# Patient Record
Sex: MALE | Race: OTHER | URBAN - METROPOLITAN AREA
[De-identification: names, ages, dates, MRNs, and addresses within clinical notes are randomized per-mention and may not be internally consistent; named-entity substitution may affect disease eponyms.]

---

## 2017-04-26 ENCOUNTER — APPOINTMENT (RX ONLY)
Dept: URBAN - METROPOLITAN AREA CLINIC 39 | Facility: CLINIC | Age: 82
Setting detail: DERMATOLOGY
End: 2017-04-26

## 2017-04-26 DIAGNOSIS — L82.1 OTHER SEBORRHEIC KERATOSIS: ICD-10-CM

## 2017-04-26 DIAGNOSIS — L82.0 INFLAMED SEBORRHEIC KERATOSIS: ICD-10-CM

## 2017-04-26 PROBLEM — D48.5 NEOPLASM OF UNCERTAIN BEHAVIOR OF SKIN: Status: ACTIVE | Noted: 2017-04-26

## 2017-04-26 PROCEDURE — ? BIOPSY BY SHAVE METHOD

## 2017-04-26 PROCEDURE — 11101: CPT

## 2017-04-26 PROCEDURE — ? COUNSELING

## 2017-04-26 PROCEDURE — ? OTHER

## 2017-04-26 PROCEDURE — 99202 OFFICE O/P NEW SF 15 MIN: CPT | Mod: 25

## 2017-04-26 PROCEDURE — 11100: CPT

## 2017-04-26 ASSESSMENT — LOCATION ZONE DERM: LOCATION ZONE: FACE

## 2017-04-26 ASSESSMENT — LOCATION DETAILED DESCRIPTION DERM
LOCATION DETAILED: RIGHT INFERIOR CENTRAL MALAR CHEEK
LOCATION DETAILED: LEFT CENTRAL MALAR CHEEK
LOCATION DETAILED: RIGHT LATERAL FOREHEAD
LOCATION DETAILED: LEFT INFERIOR CENTRAL MALAR CHEEK

## 2017-04-26 ASSESSMENT — LOCATION SIMPLE DESCRIPTION DERM
LOCATION SIMPLE: RIGHT FOREHEAD
LOCATION SIMPLE: LEFT CHEEK
LOCATION SIMPLE: RIGHT CHEEK

## 2017-04-26 NOTE — PROCEDURE: OTHER
Detail Level: Simple
Note Text (......Xxx Chief Complaint.): This diagnosis correlates with the
Other (Free Text): The E/M service provided during this visit was medically necessary, significant, and independent from the procedure(s) provided on the same date of service and included documented elements above and beyond the usual pre-, intra-, and post-operative work associated with the procedure(s).

## 2017-04-26 NOTE — HPI: SKIN LESION
How Severe Is Your Skin Lesion?: mild
Has Your Skin Lesion Been Treated?: been treated
Is This A New Presentation, Or A Follow-Up?: Skin Lesion
When Was It Treated?: 03/26/17

## 2017-04-26 NOTE — PROCEDURE: BIOPSY BY SHAVE METHOD
Post-Care Instructions: In detail post-care instructions were reviewed with the patient. Patient is to keep the biopsy site dry overnight, and then wash with warm soapy water and apply bacitracin or Aquaphor twice daily until healed.

## 2018-06-08 ENCOUNTER — APPOINTMENT (RX ONLY)
Dept: URBAN - METROPOLITAN AREA CLINIC 39 | Facility: CLINIC | Age: 83
Setting detail: DERMATOLOGY
End: 2018-06-08

## 2018-06-08 DIAGNOSIS — D22 MELANOCYTIC NEVI: ICD-10-CM

## 2018-06-08 DIAGNOSIS — L82.1 OTHER SEBORRHEIC KERATOSIS: ICD-10-CM

## 2018-06-08 DIAGNOSIS — D485 NEOPLASM OF UNCERTAIN BEHAVIOR OF SKIN: ICD-10-CM

## 2018-06-08 DIAGNOSIS — Z85.828 PERSONAL HISTORY OF OTHER MALIGNANT NEOPLASM OF SKIN: ICD-10-CM

## 2018-06-08 DIAGNOSIS — L57.0 ACTINIC KERATOSIS: ICD-10-CM

## 2018-06-08 PROBLEM — D22.9 MELANOCYTIC NEVI, UNSPECIFIED: Status: ACTIVE | Noted: 2018-06-08

## 2018-06-08 PROBLEM — D48.5 NEOPLASM OF UNCERTAIN BEHAVIOR OF SKIN: Status: ACTIVE | Noted: 2018-06-08

## 2018-06-08 PROBLEM — E13.9 OTHER SPECIFIED DIABETES MELLITUS WITHOUT COMPLICATIONS: Status: ACTIVE | Noted: 2018-06-08

## 2018-06-08 PROCEDURE — 17000 DESTRUCT PREMALG LESION: CPT

## 2018-06-08 PROCEDURE — 99213 OFFICE O/P EST LOW 20 MIN: CPT | Mod: 25

## 2018-06-08 PROCEDURE — 11101: CPT

## 2018-06-08 PROCEDURE — ? LIQUID NITROGEN

## 2018-06-08 PROCEDURE — ? BIOPSY BY SHAVE METHOD

## 2018-06-08 PROCEDURE — ? COUNSELING

## 2018-06-08 PROCEDURE — 17003 DESTRUCT PREMALG LES 2-14: CPT

## 2018-06-08 PROCEDURE — 11100: CPT | Mod: 59

## 2018-06-08 ASSESSMENT — LOCATION DETAILED DESCRIPTION DERM
LOCATION DETAILED: RIGHT CENTRAL MALAR CHEEK
LOCATION DETAILED: RIGHT SUPERIOR CENTRAL MALAR CHEEK
LOCATION DETAILED: LEFT CENTRAL ZYGOMA
LOCATION DETAILED: LEFT SUPERIOR CENTRAL BUCCAL CHEEK
LOCATION DETAILED: RIGHT CENTRAL ZYGOMA

## 2018-06-08 ASSESSMENT — LOCATION ZONE DERM: LOCATION ZONE: FACE

## 2018-06-08 ASSESSMENT — LOCATION SIMPLE DESCRIPTION DERM
LOCATION SIMPLE: LEFT ZYGOMA
LOCATION SIMPLE: LEFT CHEEK
LOCATION SIMPLE: RIGHT ZYGOMA
LOCATION SIMPLE: RIGHT CHEEK

## 2018-06-08 NOTE — PROCEDURE: LIQUID NITROGEN

## 2018-06-08 NOTE — PROCEDURE: BIOPSY BY SHAVE METHOD
Lab: [id number]
Billing Type: Third-Party Bill
Electrodesiccation Text: The wound bed was treated with electrodesiccation after the biopsy was performed.
Anesthesia Type: 1% lidocaine without epinephrine
Hemostasis: Aluminum Chloride
Destruction After The Procedure: No
Wound Care: Petrolatum
Was A Bandage Applied: Yes
Type Of Destruction Used: Curettage
X Size Of Lesion In Cm: 0
Biopsy Type: H and E
Biopsy Method: Dermablade
Cryotherapy Text: The wound bed was treated with cryotherapy after the biopsy was performed.
Post-Care Instructions: I reviewed with the patient in detail post-care instructions. Patient is to keep the biopsy site dry overnight, and then apply bacitracin twice daily until healed. Patient may apply hydrogen peroxide soaks to remove any crusting.
Electrodesiccation And Curettage Text: The wound bed was treated with electrodesiccation and curettage after the biopsy was performed.
Detail Level: Detailed
Curettage Text: The wound bed was treated with curettage after the biopsy was performed.
Dressing: bandage
Silver Nitrate Text: The wound bed was treated with silver nitrate after the biopsy was performed.
Consent: Written consent was obtained and risks were reviewed including but not limited to scarring, infection, bleeding, scabbing, incomplete removal, nerve damage and allergy to anesthesia.
Notification Instructions: Patient will be notified of biopsy results. However, patient instructed to call the office if not contacted within 2 weeks.
Anesthesia Volume In Cc (Will Not Render If 0): 0.5

## 2018-06-08 NOTE — PROCEDURE: MIPS QUALITY
Quality 110: Preventive Care And Screening: Influenza Immunization: Influenza Immunization Administered during Influenza season
Quality 431: Preventive Care And Screening: Unhealthy Alcohol Use - Screening: Patient screened for unhealthy alcohol use using a single question and scores less than 2 times per year
Quality 111:Pneumonia Vaccination Status For Older Adults: Pneumococcal Vaccination Previously Received
Quality 226: Preventive Care And Screening: Tobacco Use: Screening And Cessation Intervention: Patient screened for tobacco and never smoked
Detail Level: Detailed
Quality 47: Advance Care Plan: Advance Care Planning discussed and documented; advance care plan or surrogate decision maker documented in the medical record.

## 2018-06-22 ENCOUNTER — APPOINTMENT (RX ONLY)
Dept: URBAN - METROPOLITAN AREA CLINIC 39 | Facility: CLINIC | Age: 83
Setting detail: DERMATOLOGY
End: 2018-06-22

## 2018-06-22 DIAGNOSIS — B07.8 OTHER VIRAL WARTS: ICD-10-CM

## 2018-06-22 DIAGNOSIS — L57.0 ACTINIC KERATOSIS: ICD-10-CM

## 2018-06-22 PROBLEM — I10 ESSENTIAL (PRIMARY) HYPERTENSION: Status: ACTIVE | Noted: 2018-06-22

## 2018-06-22 PROCEDURE — 17003 DESTRUCT PREMALG LES 2-14: CPT

## 2018-06-22 PROCEDURE — 17000 DESTRUCT PREMALG LESION: CPT | Mod: 59

## 2018-06-22 PROCEDURE — ? LIQUID NITROGEN

## 2018-06-22 PROCEDURE — ? COUNSELING

## 2018-06-22 PROCEDURE — 17110 DESTRUCTION B9 LES UP TO 14: CPT

## 2018-06-22 ASSESSMENT — LOCATION DETAILED DESCRIPTION DERM
LOCATION DETAILED: LEFT SUPERIOR MEDIAL MALAR CHEEK
LOCATION DETAILED: RIGHT CENTRAL MALAR CHEEK
LOCATION DETAILED: RIGHT SUPERIOR CENTRAL MALAR CHEEK
LOCATION DETAILED: LEFT SUPERIOR CENTRAL BUCCAL CHEEK

## 2018-06-22 ASSESSMENT — LOCATION SIMPLE DESCRIPTION DERM
LOCATION SIMPLE: LEFT CHEEK
LOCATION SIMPLE: RIGHT CHEEK

## 2018-06-22 ASSESSMENT — LOCATION ZONE DERM: LOCATION ZONE: FACE

## 2018-06-22 NOTE — PROCEDURE: MIPS QUALITY
Quality 111:Pneumonia Vaccination Status For Older Adults: Pneumococcal Vaccination Previously Received
Quality 110: Preventive Care And Screening: Influenza Immunization: Influenza Immunization Administered during Influenza season
Quality 47: Advance Care Plan: Advance Care Planning discussed and documented; advance care plan or surrogate decision maker documented in the medical record.
Quality 226: Preventive Care And Screening: Tobacco Use: Screening And Cessation Intervention: Patient screened for tobacco and never smoked
Detail Level: Detailed
Quality 431: Preventive Care And Screening: Unhealthy Alcohol Use - Screening: Patient screened for unhealthy alcohol use using a single question and scores less than 2 times per year

## 2018-06-22 NOTE — PROCEDURE: LIQUID NITROGEN
Add 52 Modifier (Optional): no
Medical Necessity Clause: This procedure was medically necessary because the lesions that were treated were:
Consent: The patient's consent was obtained including but not limited to risks of crusting, scabbing, blistering, scarring, darker or lighter pigmentary change, recurrence, incomplete removal and infection.
Detail Level: Detailed
Medical Necessity Information: It is in your best interest to select a reason for this procedure from the list below. All of these items fulfill various CMS LCD requirements except the new and changing color options.
Post-Care Instructions: I reviewed with the patient in detail post-care instructions. Patient is to wear sunprotection, and avoid picking at any of the treated lesions. Pt may apply Vaseline to crusted or scabbing areas.
Duration Of Freeze Thaw-Cycle (Seconds): 10

## 2020-02-11 ENCOUNTER — APPOINTMENT (RX ONLY)
Dept: URBAN - METROPOLITAN AREA CLINIC 39 | Facility: CLINIC | Age: 85
Setting detail: DERMATOLOGY
End: 2020-02-11

## 2020-02-11 DIAGNOSIS — L57.0 ACTINIC KERATOSIS: ICD-10-CM

## 2020-02-11 PROCEDURE — 17003 DESTRUCT PREMALG LES 2-14: CPT

## 2020-02-11 PROCEDURE — 17000 DESTRUCT PREMALG LESION: CPT

## 2020-02-11 PROCEDURE — ? COUNSELING

## 2020-02-11 PROCEDURE — ? LIQUID NITROGEN

## 2020-02-11 ASSESSMENT — LOCATION DETAILED DESCRIPTION DERM
LOCATION DETAILED: RIGHT SUPERIOR LATERAL MALAR CHEEK
LOCATION DETAILED: RIGHT CENTRAL MALAR CHEEK

## 2020-02-11 ASSESSMENT — LOCATION ZONE DERM: LOCATION ZONE: FACE

## 2020-02-11 ASSESSMENT — LOCATION SIMPLE DESCRIPTION DERM: LOCATION SIMPLE: RIGHT CHEEK

## 2020-02-11 NOTE — PROCEDURE: LIQUID NITROGEN
Detail Level: Detailed
Duration Of Freeze Thaw-Cycle (Seconds): 3
Render Post-Care Instructions In Note?: no
Consent: The patient's consent was obtained.  Risks associated with liquid nitrogen including but not limited to crusting, scabbing, blistering, scarring, darker or lighter pigmentary change, recurrence, incomplete removal and infection.
Post-Care Instructions: I reviewed with the patient in detail post-care instructions. Patient is to wear sunprotection, and avoid picking at any of the treated lesions. Pt may apply Vaseline to crusted or scabbing areas.

## 2022-03-14 ENCOUNTER — PREPPED CHART (OUTPATIENT)
Dept: URBAN - METROPOLITAN AREA CLINIC 50 | Facility: CLINIC | Age: 87
End: 2022-03-14

## 2022-03-14 PROBLEM — H35.3221 NEOVASCULAR AMD WITH ACTIVE CNV: Noted: 2022-03-14

## 2022-03-14 PROBLEM — E11.3293 MILD NONPROLIFERATIVE DIABETIC RETINOPATHY: Noted: 2022-03-14

## 2022-03-14 PROBLEM — Z79.4 MILD NONPROLIFERATIVE DIABETIC RETINOPATHY: Noted: 2022-03-14

## 2022-03-14 PROBLEM — H43.813 POSTERIOR VITREOUS DETACHMENT: Noted: 2022-03-14

## 2022-03-14 PROBLEM — H35.3112 DRY AMD, INTERMEDIATE DRY STAGE: Noted: 2022-03-14

## 2022-03-14 PROBLEM — E11.3293 DIABETES, TYPE II WITH OCULAR COMPLICATIONS: Noted: 2022-03-14

## 2022-03-14 PROBLEM — Z79.4 DIABETES, TYPE II WITH OCULAR COMPLICATIONS: Noted: 2022-03-14

## 2022-03-14 PROBLEM — H35.3221 NEOVASCULAR AMD WITH ACTIVE CNV: Status: DETERIORATING | Noted: 2022-03-14

## 2022-04-06 ASSESSMENT — VISUAL ACUITY
OD_PH: 20/20
OS_PH: 20/40
OS_SC: 20/80-2
OD_SC: 20/25

## 2022-04-06 ASSESSMENT — TONOMETRY
OS_IOP_MMHG: 18
OD_IOP_MMHG: 17

## 2022-04-11 ENCOUNTER — FOLLOW UP (OUTPATIENT)
Dept: URBAN - METROPOLITAN AREA CLINIC 50 | Facility: CLINIC | Age: 87
End: 2022-04-11

## 2022-04-11 DIAGNOSIS — E11.3293: ICD-10-CM

## 2022-04-11 DIAGNOSIS — H43.813: ICD-10-CM

## 2022-04-11 DIAGNOSIS — H35.3112: ICD-10-CM

## 2022-04-11 DIAGNOSIS — H35.3221: ICD-10-CM

## 2022-04-11 PROCEDURE — 92014 COMPRE OPH EXAM EST PT 1/>: CPT

## 2022-04-11 PROCEDURE — PF5 LUCENTIS PREFILLED SYRINGE: Mod: 59,LT

## 2022-04-11 PROCEDURE — 67028 INJECTION EYE DRUG: CPT | Mod: 25,LT

## 2022-04-11 ASSESSMENT — TONOMETRY
OS_IOP_MMHG: 18
OD_IOP_MMHG: 20

## 2022-04-11 ASSESSMENT — VISUAL ACUITY
OS_PH: 20/30
OD_PH: 20/20
OS_SC: 20/100
OD_SC: 20/25

## 2022-05-09 ENCOUNTER — FOLLOW UP (OUTPATIENT)
Dept: URBAN - METROPOLITAN AREA CLINIC 50 | Facility: CLINIC | Age: 87
End: 2022-05-09

## 2022-05-09 DIAGNOSIS — H35.3112: ICD-10-CM

## 2022-05-09 DIAGNOSIS — E11.3293: ICD-10-CM

## 2022-05-09 DIAGNOSIS — H35.3221: ICD-10-CM

## 2022-05-09 DIAGNOSIS — H43.813: ICD-10-CM

## 2022-05-09 PROCEDURE — 92014 COMPRE OPH EXAM EST PT 1/>: CPT | Mod: 25

## 2022-05-09 PROCEDURE — 92134 CPTRZ OPH DX IMG PST SGM RTA: CPT

## 2022-05-09 PROCEDURE — 67028 INJECTION EYE DRUG: CPT

## 2022-05-09 PROCEDURE — PF5 LUCENTIS PREFILLED SYRINGE

## 2022-05-09 ASSESSMENT — VISUAL ACUITY
OS_PH: 20/40
OD_SC: 20/20
OS_SC: 20/100

## 2022-05-09 ASSESSMENT — TONOMETRY
OD_IOP_MMHG: 15
OS_IOP_MMHG: 15

## 2022-06-06 ENCOUNTER — FOLLOW UP (OUTPATIENT)
Dept: URBAN - METROPOLITAN AREA CLINIC 50 | Facility: CLINIC | Age: 87
End: 2022-06-06

## 2022-06-06 DIAGNOSIS — H43.813: ICD-10-CM

## 2022-06-06 DIAGNOSIS — E11.3293: ICD-10-CM

## 2022-06-06 DIAGNOSIS — H35.3221: ICD-10-CM

## 2022-06-06 DIAGNOSIS — H35.3112: ICD-10-CM

## 2022-06-06 PROCEDURE — 67028 INJECTION EYE DRUG: CPT

## 2022-06-06 PROCEDURE — PF5 LUCENTIS PREFILLED SYRINGE

## 2022-06-06 PROCEDURE — 92014 COMPRE OPH EXAM EST PT 1/>: CPT | Mod: 25

## 2022-06-06 PROCEDURE — 92134 CPTRZ OPH DX IMG PST SGM RTA: CPT

## 2022-06-06 ASSESSMENT — VISUAL ACUITY
OD_SC: 20/20
OS_SC: 20/40-2

## 2022-06-06 ASSESSMENT — TONOMETRY
OS_IOP_MMHG: 16
OD_IOP_MMHG: 18

## 2022-07-11 ENCOUNTER — FOLLOW UP (OUTPATIENT)
Dept: URBAN - METROPOLITAN AREA CLINIC 50 | Facility: CLINIC | Age: 87
End: 2022-07-11

## 2022-07-11 DIAGNOSIS — H43.813: ICD-10-CM

## 2022-07-11 DIAGNOSIS — E11.3293: ICD-10-CM

## 2022-07-11 DIAGNOSIS — H35.3112: ICD-10-CM

## 2022-07-11 DIAGNOSIS — H35.3221: ICD-10-CM

## 2022-07-11 PROCEDURE — 92014 COMPRE OPH EXAM EST PT 1/>: CPT | Mod: 25

## 2022-07-11 PROCEDURE — PF5 LUCENTIS PREFILLED SYRINGE

## 2022-07-11 PROCEDURE — 67028 INJECTION EYE DRUG: CPT

## 2022-07-11 PROCEDURE — 92134 CPTRZ OPH DX IMG PST SGM RTA: CPT

## 2022-07-11 ASSESSMENT — TONOMETRY
OS_IOP_MMHG: 13
OD_IOP_MMHG: 14

## 2022-07-11 ASSESSMENT — VISUAL ACUITY
OD_SC: 20/20
OS_SC: 20/50
OS_PH: 20/40-

## 2022-09-26 ENCOUNTER — FOLLOW UP (OUTPATIENT)
Dept: URBAN - METROPOLITAN AREA CLINIC 50 | Facility: CLINIC | Age: 87
End: 2022-09-26

## 2022-09-26 DIAGNOSIS — H35.3112: ICD-10-CM

## 2022-09-26 DIAGNOSIS — H35.3221: ICD-10-CM

## 2022-09-26 DIAGNOSIS — Z79.4: ICD-10-CM

## 2022-09-26 DIAGNOSIS — E11.3293: ICD-10-CM

## 2022-09-26 DIAGNOSIS — H43.813: ICD-10-CM

## 2022-09-26 PROCEDURE — 92134 CPTRZ OPH DX IMG PST SGM RTA: CPT

## 2022-09-26 PROCEDURE — PF5 LUCENTIS PREFILLED SYRINGE

## 2022-09-26 PROCEDURE — 67028 INJECTION EYE DRUG: CPT

## 2022-09-26 PROCEDURE — 92202 OPSCPY EXTND ON/MAC DRAW: CPT

## 2022-09-26 PROCEDURE — 99214 OFFICE O/P EST MOD 30 MIN: CPT | Mod: 25

## 2022-09-26 ASSESSMENT — VISUAL ACUITY
OS_PH: 20/40
OS_SC: 20/80+1
OD_SC: 20/25+2

## 2022-09-26 ASSESSMENT — TONOMETRY
OS_IOP_MMHG: 16
OD_IOP_MMHG: 16

## 2022-10-31 ENCOUNTER — ESTABLISHED COMPREHENSIVE EXAM (OUTPATIENT)
Dept: URBAN - METROPOLITAN AREA CLINIC 50 | Facility: CLINIC | Age: 87
End: 2022-10-31

## 2022-10-31 DIAGNOSIS — H35.3112: ICD-10-CM

## 2022-10-31 DIAGNOSIS — Z79.4: ICD-10-CM

## 2022-10-31 DIAGNOSIS — H35.3221: ICD-10-CM

## 2022-10-31 DIAGNOSIS — H43.813: ICD-10-CM

## 2022-10-31 DIAGNOSIS — E11.3293: ICD-10-CM

## 2022-10-31 PROCEDURE — 92134 CPTRZ OPH DX IMG PST SGM RTA: CPT

## 2022-10-31 PROCEDURE — PF5 LUCENTIS PREFILLED SYRINGE

## 2022-10-31 PROCEDURE — 92014 COMPRE OPH EXAM EST PT 1/>: CPT | Mod: 25

## 2022-10-31 PROCEDURE — 67028 INJECTION EYE DRUG: CPT

## 2022-10-31 ASSESSMENT — TONOMETRY
OD_IOP_MMHG: 18
OS_IOP_MMHG: 19

## 2022-10-31 ASSESSMENT — VISUAL ACUITY
OS_SC: 20/150
OS_PH: 20/60
OD_SC: 20/20

## 2022-12-05 ENCOUNTER — ESTABLISHED COMPREHENSIVE EXAM (OUTPATIENT)
Dept: URBAN - METROPOLITAN AREA CLINIC 50 | Facility: CLINIC | Age: 87
End: 2022-12-05

## 2022-12-05 DIAGNOSIS — H35.3112: ICD-10-CM

## 2022-12-05 DIAGNOSIS — H43.813: ICD-10-CM

## 2022-12-05 DIAGNOSIS — E11.3293: ICD-10-CM

## 2022-12-05 DIAGNOSIS — H35.3221: ICD-10-CM

## 2022-12-05 PROCEDURE — 67028 INJECTION EYE DRUG: CPT

## 2022-12-05 PROCEDURE — 92134 CPTRZ OPH DX IMG PST SGM RTA: CPT

## 2022-12-05 PROCEDURE — 92014 COMPRE OPH EXAM EST PT 1/>: CPT | Mod: 25

## 2022-12-05 PROCEDURE — PF5 LUCENTIS PREFILLED SYRINGE

## 2022-12-05 ASSESSMENT — TONOMETRY
OD_IOP_MMHG: 21
OS_IOP_MMHG: 12

## 2022-12-05 ASSESSMENT — VISUAL ACUITY
OS_PH: 20/60
OS_SC: 20/100-2
OD_SC: 20/20

## 2023-01-09 ENCOUNTER — ESTABLISHED COMPREHENSIVE EXAM (OUTPATIENT)
Dept: URBAN - METROPOLITAN AREA CLINIC 50 | Facility: CLINIC | Age: 88
End: 2023-01-09

## 2023-01-09 DIAGNOSIS — E11.3293: ICD-10-CM

## 2023-01-09 DIAGNOSIS — Z79.4: ICD-10-CM

## 2023-01-09 DIAGNOSIS — H35.3112: ICD-10-CM

## 2023-01-09 DIAGNOSIS — H35.3221: ICD-10-CM

## 2023-01-09 DIAGNOSIS — H43.813: ICD-10-CM

## 2023-01-09 PROCEDURE — PF5 LUCENTIS PREFILLED SYRINGE

## 2023-01-09 PROCEDURE — 92134 CPTRZ OPH DX IMG PST SGM RTA: CPT

## 2023-01-09 PROCEDURE — 92014 COMPRE OPH EXAM EST PT 1/>: CPT | Mod: 25

## 2023-01-09 PROCEDURE — 92202 OPSCPY EXTND ON/MAC DRAW: CPT | Mod: 59

## 2023-01-09 PROCEDURE — 67028 INJECTION EYE DRUG: CPT

## 2023-01-09 ASSESSMENT — TONOMETRY
OD_IOP_MMHG: 20
OS_IOP_MMHG: 18

## 2023-01-09 ASSESSMENT — VISUAL ACUITY
OS_SC: 20/100-1
OS_PH: 20/70-2
OD_SC: 20/20-1

## 2023-02-06 ENCOUNTER — FOLLOW UP (OUTPATIENT)
Dept: URBAN - METROPOLITAN AREA CLINIC 50 | Facility: CLINIC | Age: 88
End: 2023-02-06

## 2023-02-06 DIAGNOSIS — H35.3221: ICD-10-CM

## 2023-02-06 DIAGNOSIS — H35.3112: ICD-10-CM

## 2023-02-06 DIAGNOSIS — Z79.4: ICD-10-CM

## 2023-02-06 DIAGNOSIS — H43.813: ICD-10-CM

## 2023-02-06 DIAGNOSIS — E11.3293: ICD-10-CM

## 2023-02-06 PROCEDURE — 92014 COMPRE OPH EXAM EST PT 1/>: CPT | Mod: 25

## 2023-02-06 PROCEDURE — 92134 CPTRZ OPH DX IMG PST SGM RTA: CPT

## 2023-02-06 PROCEDURE — 67028 INJECTION EYE DRUG: CPT

## 2023-02-06 PROCEDURE — 92202 OPSCPY EXTND ON/MAC DRAW: CPT | Mod: 59

## 2023-02-06 PROCEDURE — PF5 LUCENTIS PREFILLED SYRINGE

## 2023-02-06 ASSESSMENT — VISUAL ACUITY
OD_SC: 20/20
OS_SC: 20/70
OS_PH: 20/50

## 2023-02-06 ASSESSMENT — TONOMETRY
OS_IOP_MMHG: 14
OD_IOP_MMHG: 14

## 2023-03-06 ENCOUNTER — FOLLOW UP (OUTPATIENT)
Dept: URBAN - METROPOLITAN AREA CLINIC 50 | Facility: CLINIC | Age: 88
End: 2023-03-06

## 2023-03-06 DIAGNOSIS — H35.3221: ICD-10-CM

## 2023-03-06 DIAGNOSIS — H43.813: ICD-10-CM

## 2023-03-06 DIAGNOSIS — E11.3293: ICD-10-CM

## 2023-03-06 DIAGNOSIS — H35.3112: ICD-10-CM

## 2023-03-06 PROCEDURE — 92014 COMPRE OPH EXAM EST PT 1/>: CPT | Mod: 25

## 2023-03-06 PROCEDURE — 92134 CPTRZ OPH DX IMG PST SGM RTA: CPT

## 2023-03-06 PROCEDURE — 92202 OPSCPY EXTND ON/MAC DRAW: CPT | Mod: NC

## 2023-03-06 PROCEDURE — 67028 INJECTION EYE DRUG: CPT

## 2023-03-06 PROCEDURE — PF5 LUCENTIS PREFILLED SYRINGE

## 2023-03-06 ASSESSMENT — VISUAL ACUITY
OD_SC: 20/20
OS_SC: 20/100-2
OS_PH: 20/50-2

## 2023-03-06 ASSESSMENT — TONOMETRY
OS_IOP_MMHG: 17
OD_IOP_MMHG: 14

## 2023-04-10 ENCOUNTER — FOLLOW UP (OUTPATIENT)
Dept: URBAN - METROPOLITAN AREA CLINIC 50 | Facility: CLINIC | Age: 88
End: 2023-04-10

## 2023-04-10 DIAGNOSIS — H35.3112: ICD-10-CM

## 2023-04-10 DIAGNOSIS — E11.3293: ICD-10-CM

## 2023-04-10 DIAGNOSIS — H35.3221: ICD-10-CM

## 2023-04-10 PROCEDURE — 67028 INJECTION EYE DRUG: CPT

## 2023-04-10 PROCEDURE — 92014 COMPRE OPH EXAM EST PT 1/>: CPT | Mod: 25

## 2023-04-10 PROCEDURE — PF5 LUCENTIS PREFILLED SYRINGE

## 2023-04-10 ASSESSMENT — VISUAL ACUITY
OD_SC: 20/20-2
OS_PH: 20/80-2
OS_SC: 20/100-1

## 2023-04-10 ASSESSMENT — TONOMETRY
OS_IOP_MMHG: 15
OD_IOP_MMHG: 14

## 2023-05-08 ENCOUNTER — FOLLOW UP (OUTPATIENT)
Dept: URBAN - METROPOLITAN AREA CLINIC 50 | Facility: CLINIC | Age: 88
End: 2023-05-08

## 2023-05-08 DIAGNOSIS — Z79.4: ICD-10-CM

## 2023-05-08 DIAGNOSIS — H35.3112: ICD-10-CM

## 2023-05-08 DIAGNOSIS — E11.3293: ICD-10-CM

## 2023-05-08 DIAGNOSIS — H35.3221: ICD-10-CM

## 2023-05-08 PROCEDURE — 92202 OPSCPY EXTND ON/MAC DRAW: CPT | Mod: 59

## 2023-05-08 PROCEDURE — 92014 COMPRE OPH EXAM EST PT 1/>: CPT | Mod: 25

## 2023-05-08 PROCEDURE — 92134 CPTRZ OPH DX IMG PST SGM RTA: CPT

## 2023-05-08 PROCEDURE — 67028 INJECTION EYE DRUG: CPT

## 2023-05-08 PROCEDURE — PF5 LUCENTIS PREFILLED SYRINGE

## 2023-05-08 ASSESSMENT — VISUAL ACUITY
OS_PH: 20/50
OD_SC: 20/25
OS_SC: 20/80

## 2023-05-08 ASSESSMENT — TONOMETRY
OD_IOP_MMHG: 16
OS_IOP_MMHG: 19

## 2023-06-05 ENCOUNTER — FOLLOW UP (OUTPATIENT)
Dept: URBAN - METROPOLITAN AREA CLINIC 50 | Facility: CLINIC | Age: 88
End: 2023-06-05

## 2023-06-05 DIAGNOSIS — H35.3112: ICD-10-CM

## 2023-06-05 DIAGNOSIS — Z79.4: ICD-10-CM

## 2023-06-05 DIAGNOSIS — E11.3293: ICD-10-CM

## 2023-06-05 DIAGNOSIS — H35.3221: ICD-10-CM

## 2023-06-05 DIAGNOSIS — H43.813: ICD-10-CM

## 2023-06-05 PROCEDURE — 92134 CPTRZ OPH DX IMG PST SGM RTA: CPT

## 2023-06-05 PROCEDURE — 92014 COMPRE OPH EXAM EST PT 1/>: CPT | Mod: 25

## 2023-06-05 PROCEDURE — 92202 OPSCPY EXTND ON/MAC DRAW: CPT | Mod: 59

## 2023-06-05 PROCEDURE — 67028 INJECTION EYE DRUG: CPT

## 2023-06-05 PROCEDURE — PF5 LUCENTIS PREFILLED SYRINGE

## 2023-06-05 ASSESSMENT — VISUAL ACUITY
OD_SC: 20/20
OS_PH: 20/50-2
OS_SC: 20/100

## 2023-06-05 ASSESSMENT — TONOMETRY
OD_IOP_MMHG: 17
OS_IOP_MMHG: 15

## 2023-07-10 ENCOUNTER — FOLLOW UP (OUTPATIENT)
Dept: URBAN - METROPOLITAN AREA CLINIC 50 | Facility: CLINIC | Age: 88
End: 2023-07-10

## 2023-07-10 DIAGNOSIS — E11.3293: ICD-10-CM

## 2023-07-10 DIAGNOSIS — Z79.4: ICD-10-CM

## 2023-07-10 DIAGNOSIS — H35.3112: ICD-10-CM

## 2023-07-10 DIAGNOSIS — H43.813: ICD-10-CM

## 2023-07-10 DIAGNOSIS — H35.3221: ICD-10-CM

## 2023-07-10 PROCEDURE — PFS EYLEA PFS: Mod: JZ

## 2023-07-10 PROCEDURE — 92014 COMPRE OPH EXAM EST PT 1/>: CPT | Mod: 25

## 2023-07-10 PROCEDURE — 67028 INJECTION EYE DRUG: CPT

## 2023-07-10 ASSESSMENT — TONOMETRY
OD_IOP_MMHG: 21
OS_IOP_MMHG: 18

## 2023-07-10 ASSESSMENT — VISUAL ACUITY
OS_SC: 20/100
OS_PH: 20/60-2
OD_SC: 20/20-1

## 2023-08-14 ENCOUNTER — FOLLOW UP (OUTPATIENT)
Dept: URBAN - METROPOLITAN AREA CLINIC 50 | Facility: CLINIC | Age: 88
End: 2023-08-14

## 2023-08-14 DIAGNOSIS — H35.3112: ICD-10-CM

## 2023-08-14 DIAGNOSIS — Z79.4: ICD-10-CM

## 2023-08-14 DIAGNOSIS — H43.813: ICD-10-CM

## 2023-08-14 DIAGNOSIS — H35.3221: ICD-10-CM

## 2023-08-14 DIAGNOSIS — E11.3293: ICD-10-CM

## 2023-08-14 PROCEDURE — 92134 CPTRZ OPH DX IMG PST SGM RTA: CPT

## 2023-08-14 PROCEDURE — 92014 COMPRE OPH EXAM EST PT 1/>: CPT | Mod: 25

## 2023-08-14 PROCEDURE — PFS EYLEA PFS: Mod: JZ

## 2023-08-14 PROCEDURE — 67028 INJECTION EYE DRUG: CPT

## 2023-08-14 PROCEDURE — 92202 OPSCPY EXTND ON/MAC DRAW: CPT | Mod: 59

## 2023-08-14 ASSESSMENT — TONOMETRY
OS_IOP_MMHG: 17
OD_IOP_MMHG: 16

## 2023-08-14 ASSESSMENT — VISUAL ACUITY
OD_SC: 20/20-2
OS_SC: 20/60
OS_PH: 20/50

## 2023-11-06 ENCOUNTER — FOLLOW UP (OUTPATIENT)
Dept: URBAN - METROPOLITAN AREA CLINIC 50 | Facility: CLINIC | Age: 88
End: 2023-11-06

## 2023-11-06 DIAGNOSIS — H35.3112: ICD-10-CM

## 2023-11-06 DIAGNOSIS — Z79.4: ICD-10-CM

## 2023-11-06 DIAGNOSIS — E11.3293: ICD-10-CM

## 2023-11-06 DIAGNOSIS — H43.813: ICD-10-CM

## 2023-11-06 DIAGNOSIS — H35.3221: ICD-10-CM

## 2023-11-06 PROCEDURE — 67028 INJECTION EYE DRUG: CPT

## 2023-11-06 PROCEDURE — 92202 OPSCPY EXTND ON/MAC DRAW: CPT | Mod: 59

## 2023-11-06 PROCEDURE — PFS EYLEA PFS: Mod: JZ

## 2023-11-06 PROCEDURE — 92134 CPTRZ OPH DX IMG PST SGM RTA: CPT

## 2023-11-06 PROCEDURE — 92014 COMPRE OPH EXAM EST PT 1/>: CPT | Mod: 25

## 2023-11-06 ASSESSMENT — VISUAL ACUITY
OD_SC: 20/20
OS_SC: 20/70-2

## 2023-11-06 ASSESSMENT — TONOMETRY
OD_IOP_MMHG: 15
OS_IOP_MMHG: 21

## 2024-01-08 ENCOUNTER — FOLLOW UP (OUTPATIENT)
Dept: URBAN - METROPOLITAN AREA CLINIC 50 | Facility: CLINIC | Age: 89
End: 2024-01-08

## 2024-01-08 DIAGNOSIS — H35.3112: ICD-10-CM

## 2024-01-08 DIAGNOSIS — H35.3221: ICD-10-CM

## 2024-01-08 DIAGNOSIS — E11.3293: ICD-10-CM

## 2024-01-08 DIAGNOSIS — Z79.4: ICD-10-CM

## 2024-01-08 DIAGNOSIS — H43.813: ICD-10-CM

## 2024-01-08 PROCEDURE — 92014 COMPRE OPH EXAM EST PT 1/>: CPT | Mod: 25

## 2024-01-08 PROCEDURE — 92202 OPSCPY EXTND ON/MAC DRAW: CPT | Mod: 59

## 2024-01-08 PROCEDURE — 92134 CPTRZ OPH DX IMG PST SGM RTA: CPT

## 2024-01-08 PROCEDURE — 67028 INJECTION EYE DRUG: CPT

## 2024-01-08 PROCEDURE — PFS EYLEA PFS: Mod: JZ

## 2024-01-08 ASSESSMENT — TONOMETRY
OS_IOP_MMHG: 19
OD_IOP_MMHG: 17

## 2024-01-08 ASSESSMENT — VISUAL ACUITY
OS_SC: 20/100
OD_SC: 20/25

## 2024-02-05 ENCOUNTER — FOLLOW UP (OUTPATIENT)
Dept: URBAN - METROPOLITAN AREA CLINIC 50 | Facility: CLINIC | Age: 89
End: 2024-02-05

## 2024-02-05 DIAGNOSIS — Z79.4: ICD-10-CM

## 2024-02-05 DIAGNOSIS — H35.3112: ICD-10-CM

## 2024-02-05 DIAGNOSIS — H35.3221: ICD-10-CM

## 2024-02-05 DIAGNOSIS — H43.813: ICD-10-CM

## 2024-02-05 DIAGNOSIS — E11.3293: ICD-10-CM

## 2024-02-05 PROCEDURE — 92014 COMPRE OPH EXAM EST PT 1/>: CPT | Mod: 25

## 2024-02-05 PROCEDURE — 92134 CPTRZ OPH DX IMG PST SGM RTA: CPT

## 2024-02-05 PROCEDURE — 67028 INJECTION EYE DRUG: CPT

## 2024-02-05 PROCEDURE — 92202 OPSCPY EXTND ON/MAC DRAW: CPT | Mod: 59

## 2024-02-05 ASSESSMENT — VISUAL ACUITY
OS_PH: 20/60+2
OS_SC: 20/70
OD_SC: 20/20-1

## 2024-02-05 ASSESSMENT — TONOMETRY
OD_IOP_MMHG: 20
OS_IOP_MMHG: 18

## 2024-03-04 ENCOUNTER — FOLLOW UP (OUTPATIENT)
Dept: URBAN - METROPOLITAN AREA CLINIC 50 | Facility: CLINIC | Age: 89
End: 2024-03-04

## 2024-03-04 DIAGNOSIS — H43.813: ICD-10-CM

## 2024-03-04 DIAGNOSIS — H35.3112: ICD-10-CM

## 2024-03-04 DIAGNOSIS — Z79.4: ICD-10-CM

## 2024-03-04 DIAGNOSIS — H35.3221: ICD-10-CM

## 2024-03-04 DIAGNOSIS — E11.3293: ICD-10-CM

## 2024-03-04 PROCEDURE — 92134 CPTRZ OPH DX IMG PST SGM RTA: CPT

## 2024-03-04 PROCEDURE — 92202 OPSCPY EXTND ON/MAC DRAW: CPT | Mod: 59

## 2024-03-04 PROCEDURE — 92014 COMPRE OPH EXAM EST PT 1/>: CPT | Mod: 25

## 2024-03-04 PROCEDURE — 67028 INJECTION EYE DRUG: CPT

## 2024-03-04 ASSESSMENT — VISUAL ACUITY
OS_SC: 20/80
OD_SC: 20/20
OS_PH: 20/50-2

## 2024-03-04 ASSESSMENT — TONOMETRY
OS_IOP_MMHG: 15
OD_IOP_MMHG: 17

## 2024-04-15 ENCOUNTER — FOLLOW UP (OUTPATIENT)
Dept: URBAN - METROPOLITAN AREA CLINIC 50 | Facility: CLINIC | Age: 89
End: 2024-04-15

## 2024-04-15 DIAGNOSIS — H35.3112: ICD-10-CM

## 2024-04-15 DIAGNOSIS — H35.3221: ICD-10-CM

## 2024-04-15 DIAGNOSIS — Z79.4: ICD-10-CM

## 2024-04-15 DIAGNOSIS — E11.3293: ICD-10-CM

## 2024-04-15 DIAGNOSIS — H43.813: ICD-10-CM

## 2024-04-15 PROCEDURE — 92014 COMPRE OPH EXAM EST PT 1/>: CPT | Mod: 25

## 2024-04-15 PROCEDURE — 67028 INJECTION EYE DRUG: CPT

## 2024-04-15 PROCEDURE — 92202 OPSCPY EXTND ON/MAC DRAW: CPT | Mod: 59

## 2024-04-15 ASSESSMENT — TONOMETRY
OD_IOP_MMHG: 17
OS_IOP_MMHG: 16

## 2024-04-15 ASSESSMENT — VISUAL ACUITY
OD_SC: 20/25-1
OS_SC: 20/100
OS_PH: 20/70

## 2024-06-03 ENCOUNTER — FOLLOW UP (OUTPATIENT)
Dept: URBAN - METROPOLITAN AREA CLINIC 50 | Facility: CLINIC | Age: 89
End: 2024-06-03

## 2024-06-03 DIAGNOSIS — H43.813: ICD-10-CM

## 2024-06-03 DIAGNOSIS — E11.3293: ICD-10-CM

## 2024-06-03 DIAGNOSIS — Z79.4: ICD-10-CM

## 2024-06-03 DIAGNOSIS — H35.3112: ICD-10-CM

## 2024-06-03 DIAGNOSIS — H35.3221: ICD-10-CM

## 2024-06-03 PROCEDURE — 67028 INJECTION EYE DRUG: CPT

## 2024-06-03 PROCEDURE — 92014 COMPRE OPH EXAM EST PT 1/>: CPT | Mod: 25

## 2024-06-03 PROCEDURE — 92134 CPTRZ OPH DX IMG PST SGM RTA: CPT

## 2024-06-03 PROCEDURE — 92202 OPSCPY EXTND ON/MAC DRAW: CPT | Mod: 59

## 2024-06-03 ASSESSMENT — VISUAL ACUITY
OS_SC: 20/80
OD_SC: 20/20
OS_PH: 20/60

## 2024-06-03 ASSESSMENT — TONOMETRY
OS_IOP_MMHG: 17
OD_IOP_MMHG: 21

## 2024-08-05 ENCOUNTER — FOLLOW UP (OUTPATIENT)
Dept: URBAN - METROPOLITAN AREA CLINIC 50 | Facility: CLINIC | Age: 89
End: 2024-08-05

## 2024-08-05 DIAGNOSIS — H35.3221: ICD-10-CM

## 2024-08-05 DIAGNOSIS — H35.3112: ICD-10-CM

## 2024-08-05 PROCEDURE — 92134 CPTRZ OPH DX IMG PST SGM RTA: CPT

## 2024-08-05 PROCEDURE — 92202 OPSCPY EXTND ON/MAC DRAW: CPT | Mod: 59

## 2024-08-05 PROCEDURE — 92014 COMPRE OPH EXAM EST PT 1/>: CPT | Mod: 25

## 2024-08-05 PROCEDURE — 67028 INJECTION EYE DRUG: CPT

## 2024-08-05 ASSESSMENT — TONOMETRY
OS_IOP_MMHG: 16
OD_IOP_MMHG: 15

## 2024-08-05 ASSESSMENT — VISUAL ACUITY
OS_PH: 20/60
OS_SC: 20/200
OD_SC: 20/25-2

## 2024-09-16 ENCOUNTER — FOLLOW UP (OUTPATIENT)
Dept: URBAN - METROPOLITAN AREA CLINIC 50 | Facility: CLINIC | Age: 89
End: 2024-09-16

## 2024-09-16 DIAGNOSIS — E11.3293: ICD-10-CM

## 2024-09-16 DIAGNOSIS — H35.3221: ICD-10-CM

## 2024-09-16 DIAGNOSIS — H35.3112: ICD-10-CM

## 2024-09-16 PROCEDURE — 92202 OPSCPY EXTND ON/MAC DRAW: CPT | Mod: 59

## 2024-09-16 PROCEDURE — 67028 INJECTION EYE DRUG: CPT

## 2024-09-16 PROCEDURE — 92014 COMPRE OPH EXAM EST PT 1/>: CPT | Mod: 25

## 2024-09-16 PROCEDURE — 92134 CPTRZ OPH DX IMG PST SGM RTA: CPT

## 2024-09-16 ASSESSMENT — VISUAL ACUITY
OS_SC: 20/100
OD_SC: 20/20

## 2024-09-16 ASSESSMENT — TONOMETRY
OD_IOP_MMHG: 17
OS_IOP_MMHG: 15

## 2024-10-28 ENCOUNTER — FOLLOW UP (OUTPATIENT)
Dept: URBAN - METROPOLITAN AREA CLINIC 50 | Facility: CLINIC | Age: 89
End: 2024-10-28

## 2024-10-28 DIAGNOSIS — H35.3112: ICD-10-CM

## 2024-10-28 DIAGNOSIS — H35.3221: ICD-10-CM

## 2024-10-28 PROCEDURE — 92014 COMPRE OPH EXAM EST PT 1/>: CPT | Mod: 25

## 2024-10-28 PROCEDURE — 92202 OPSCPY EXTND ON/MAC DRAW: CPT | Mod: 59

## 2024-10-28 PROCEDURE — 67028 INJECTION EYE DRUG: CPT

## 2024-10-28 PROCEDURE — 92134 CPTRZ OPH DX IMG PST SGM RTA: CPT

## 2024-10-28 ASSESSMENT — TONOMETRY
OD_IOP_MMHG: 18
OS_IOP_MMHG: 16

## 2024-10-28 ASSESSMENT — VISUAL ACUITY
OS_PH: 20/60-1
OD_SC: 20/20-1
OS_SC: 20/100-2